# Patient Record
Sex: FEMALE
[De-identification: names, ages, dates, MRNs, and addresses within clinical notes are randomized per-mention and may not be internally consistent; named-entity substitution may affect disease eponyms.]

---

## 2017-07-08 ENCOUNTER — HEALTH MAINTENANCE LETTER (OUTPATIENT)
Age: 52
End: 2017-07-08

## 2018-08-30 ENCOUNTER — OFFICE VISIT (OUTPATIENT)
Dept: OBSTETRICS AND GYNECOLOGY | Facility: CLINIC | Age: 53
End: 2018-08-30
Payer: MEDICARE

## 2018-08-30 VITALS
SYSTOLIC BLOOD PRESSURE: 116 MMHG | BODY MASS INDEX: 38.26 KG/M2 | WEIGHT: 215.94 LBS | HEIGHT: 63 IN | DIASTOLIC BLOOD PRESSURE: 72 MMHG

## 2018-08-30 DIAGNOSIS — Z00.00 ANNUAL PHYSICAL EXAM: Primary | ICD-10-CM

## 2018-08-30 DIAGNOSIS — N95.1 MENOPAUSAL STATE: ICD-10-CM

## 2018-08-30 DIAGNOSIS — N95.2 ATROPHIC VAGINITIS: ICD-10-CM

## 2018-08-30 DIAGNOSIS — Z90.710 STATUS POST HYSTERECTOMY: ICD-10-CM

## 2018-08-30 DIAGNOSIS — N39.3 URINARY, INCONTINENCE, STRESS FEMALE: ICD-10-CM

## 2018-08-30 PROCEDURE — 88175 CYTOPATH C/V AUTO FLUID REDO: CPT

## 2018-08-30 PROCEDURE — 99999 PR PBB SHADOW E&M-NEW PATIENT-LVL III: CPT | Mod: PBBFAC,,, | Performed by: OBSTETRICS & GYNECOLOGY

## 2018-08-30 PROCEDURE — G0101 CA SCREEN;PELVIC/BREAST EXAM: HCPCS | Mod: S$GLB,,, | Performed by: OBSTETRICS & GYNECOLOGY

## 2018-08-30 PROCEDURE — 3008F BODY MASS INDEX DOCD: CPT | Mod: CPTII,S$GLB,, | Performed by: OBSTETRICS & GYNECOLOGY

## 2018-08-30 RX ORDER — CLOTRIMAZOLE AND BETAMETHASONE DIPROPIONATE 10; .64 MG/G; MG/G
CREAM TOPICAL
Qty: 15 G | Refills: 4 | Status: SHIPPED | OUTPATIENT
Start: 2018-08-30 | End: 2019-08-30

## 2018-08-30 RX ORDER — ESTRADIOL 0.1 MG/G
2 CREAM VAGINAL
Qty: 42.5 G | Refills: 6 | Status: SHIPPED | OUTPATIENT
Start: 2018-08-30 | End: 2019-10-24 | Stop reason: SDUPTHER

## 2018-08-30 NOTE — PROGRESS NOTES
Subjective:      Chief Complaint:    Chief Complaint   Patient presents with    Well Woman       Menstrual History:    OB History      Para Term  AB Living    2         2    SAB TAB Ectopic Multiple Live Births                       Menarche age: 11     No LMP recorded (lmp unknown). Patient has had a hysterectomy.                Objective:      PRESENT ILLNESS, GYN NOTE    The patient is 53 years of age, new patient to [facility], new patient to me.     2, para 2, postmenopausal.    Medical: asthma, anemia, hyperactive bladder, liver abnormalityiron deficiency,   urinary stress incontinence.    Surgery: hysterectomy, salpingo-oophorectomy, one ablation, tubal ligation, A   and P repair twice with mesh.  However, the patient is still complaining of   severe urinary stress incontinence, dryness, not sexually active.  The patient   also noted some genital discoloration of the vulva.    PHYSICAL EXAMINATION:  VITAL SIGNS:  The patient's blood pressure 116/72, weight 215 pounds.  BREASTS:  No lumps, mass, discharge, skin changes, retraction, nipple changes.    Axilla negative.  PELVIC:  External, vulva, whitish discoloration compatible with lichen   sclerosus, also some intertriginous dermatitis, probably secondary to urine in   pad that she wears.  Vagina, very thin, atrophic.  A and P repair is noted.    There is probably no erosion, but scarring noted around the graft and is   palpable.  Careful examination of vagina did not show any erosion.  [place], good   pelvic support.  Cervix, uterus and adnexa is no longer palpable.  The uterus is   absent.  RECTAL:  Negative.    IMPRESSION:  Atrophic, thin vagina, urinary stress incontinence, vulvitis and   most likely lichen sclerosus.    PLAN:  Pap smear was taken.  We will order a mammogram, bone density.  Since the   patient is not on any estrogen replacement therapy, we will start vaginal   estrogen 2 g every second day.  We will give the patient some  Lotrisone ointment   for the outside and we will see the patient back in two to three weeks and   reevaluate her and may require vulvar biopsy, also multivitamins and calcium and   vitamin D.            / [id number] blank(s)        RODRIGO/DARWIN  dd: 08/30/2018 13:45:28 (CDT)  td: 08/31/2018 04:54:29 (CDT)  Doc ID   #[id number]  Job ID #[id number]    CC:       History of Present Illness AND  Examination detailed DICTATE:        Physical Exam   Constitutional: She is oriented to person, place, and time. She appears well-developed and well-nourished. No distress.   HENT:   Head: Normoceph  Eyes: Pupils are equal, round, and reactive to light.   Neck: Neck supple.   Cardiovascular: Normal rate, regular rhythm and normal heart sounds. No murmur heard.  Pulmonary/Chest: Effort normal and breath sounds normal. No respiratory distress. She has no wheezes. She has no rales.   Abdominal: Bowel sounds are normal. She exhibits no distension and no mass. There is no tenderness. There is no rebound and no guarding.  Musculoskeletal: Normal range of motion.   Lymphadenopathy:        Right: No inguinal adenopathy present.        Left: No inguinal adenopathy present. .  Skin: Skin is warm. No rash noted.        Review of Systems  Review of Systems   Normal ROS:   Constitutional: Negative for fever, chills, activity change fatigue and unexpected weight change.   HENT: Negative for nosebleeds, congestion.  Eyes: Negative for visual disturbance.   Respiratory: Negative for shortness of breath and wheezing.    Cardiovascular: Negative for chest pain, palpitations.   Gastrointestinal: Negative for abdominal pain, diarrhea, constipation, blood in stool and abdominal distention.   Musculoskeletal: Negative for back pain.   Allergic/Immunologic: Negative f.   Neurological: Negative .   Hematological: Negative    Psychiatric/Behavioral: Negative .    Assessment:      Diagnosis: menopausal   ATROPHIC     VAG     LICHEN   SCLEROSIS   DERMATITIS       Plan:       Return in 4  weeks

## 2018-08-30 NOTE — PATIENT INSTRUCTIONS

## 2018-09-05 ENCOUNTER — TELEPHONE (OUTPATIENT)
Dept: OBSTETRICS AND GYNECOLOGY | Facility: CLINIC | Age: 53
End: 2018-09-05

## 2018-09-05 NOTE — TELEPHONE ENCOUNTER
----- Message from Sofie Shaffer sent at 9/5/2018  9:05 AM CDT -----  Contact: [facility]  Pharmacist calling to speak to a nurse regarding med below. [phone].    estradiol (ESTRACE) 0.01 % (0.1 mg/gram) vaginal cream      --------------------------------------------------------------------  9/5/18 @ 0928 ([facility])   SPOKE WITH THE PHARMACIST , CLARIFIED APPLICATION INSTRUCTIONS WITH THE PHARMACIST

## 2018-09-05 NOTE — TELEPHONE ENCOUNTER
----- Message from Gricelda Noland sent at 9/5/2018  1:57 PM CDT -----  Contact: self  Pt called to state that the pharmacy has bee calling to clarify directions of script of estradiol (ESTRACE) 0.01 % (0.1 mg/gram) vaginal cream. Should the patient be taking twice a week or daily. Please call [phone]. Pt can be reached at [phone].  ----------------------------------------------------------  9/5/18 @ 3809 ([facility])  SPOKE WITH [facility] PHARMACIST FOR THE 2ND TIME, INSTRUCTED THEM THAT THE ESTRACE CREAM IS FOR 2GMS 2 X A WEEK

## 2018-09-05 NOTE — TELEPHONE ENCOUNTER
----- Message from Chiquita Delacruz sent at 9/5/2018  3:30 PM CDT -----  Contact: Self   Patient says her medication is not covered. Please call patient at [phone]  ----------------------------------------------------------------  9/5/18 @ 3206 ([facility])   SPOKE WITH MS REYES , SHE STATED HER INSURANCE COMPANY WILL NOT PAY/ COVER THE   ESTRADIOL CREAM, IS THERE SOMETHING ELSE THAT CAN BE ORDERED

## 2018-09-05 NOTE — TELEPHONE ENCOUNTER
----- Message from Ros Webster sent at 9/5/2018 10:38 AM CDT -----  Contact: Self   Patient is asking for the office to give her a call. She would like the results from her last OV. Please call at [phone].  -------------------------------------------------------  9/5/18 @ 8313 ([facility])  SPOKE WITH MS REYES, REVIEWED WITH HER DR KEARNEY PLAN FOR HER , AND THE TEST HE ORDERED FOR HER :  Pap smear was taken.  We will order a mammogram, bone density.  Since the   patient is not on any estrogen replacement therapy, we will start vaginal   estrogen 2 g every second day.  We will give the patient some Lotrisone ointment   for the outside and we will see the patient back in two to three weeks and   reevaluate her and may require vulvar biopsy, also multivitamins and calcium and   vitamin D.    ALSO INFORMED HER THAT HER PAP SMEAR IS NORMAL AND SHE CAN REPEAT IN 1 YEAR

## 2018-09-07 ENCOUNTER — TELEPHONE (OUTPATIENT)
Dept: OBSTETRICS AND GYNECOLOGY | Facility: CLINIC | Age: 53
End: 2018-09-07

## 2018-09-07 NOTE — TELEPHONE ENCOUNTER
----- Message from Loren Ryan sent at 9/7/2018 11:47 AM CDT -----  Contact: self  Pt wants to speak nurse regarding health she's needing a prior authorization. She says she apologizes she gave the wrong Please call asap at [phone]   ----------------------------------------------  9/7/18 @ 4822 ([facility])   SPOKE WITH MS REYES , INFORMED HER THAT  WE ORDERED THE LOTRISONE CREAM BUT AS SEPARATE RX FOR CLOTRIMAZOLE AND BETAMETHASONE AND SHE CAN MIX THEM TOGETHER AND PUT A PA IN FOR HER ESTRACE CREAM, PT STATED HER UNDERSTANDING

## 2018-09-10 ENCOUNTER — TELEPHONE (OUTPATIENT)
Dept: OBSTETRICS AND GYNECOLOGY | Facility: CLINIC | Age: 53
End: 2018-09-10

## 2018-09-10 DIAGNOSIS — F52.9 FEMALE SEXUAL DYSFUNCTION: ICD-10-CM

## 2018-09-10 DIAGNOSIS — N95.2 ATROPHIC VAGINITIS: ICD-10-CM

## 2018-09-10 DIAGNOSIS — N95.1 MENOPAUSAL STATE: Primary | ICD-10-CM

## 2018-09-10 NOTE — TELEPHONE ENCOUNTER
----- Message from Buffy Johnson sent at 9/10/2018  8:40 AM CDT -----  Contact: ALFREDITO Cerna called regarding  pt's clotrimazole-betamethasone 1-0.05% (LOTRISONE) cream. She has additional questions. Contact her at [phone].    Thanks-  -------------------------------------------  9*10/18 @ 0979 ([facility])  SPOKE WITH HENNA, INFORMED HER WE ORDERED THE LOTRISONE CREAM , AS SEPARATE INSTEAD IN A COMBINATION , AND INSTRUCTED PT TO MIX THEM TOGETHER HERSELF, PHARMACIST INSTRUCTED PT ON MIXING , ALSO INFORMED HER THAT THE ESTRACE PA WAS SENT OVER BECAUSE THE PT AND PHARMACIST STATED IT WAS NEEDED, HENNA STATED THE PT RECEIVED THE GENERIC , INFORMED HER THAT IS FINE

## 2018-09-12 ENCOUNTER — HOSPITAL ENCOUNTER (OUTPATIENT)
Dept: RADIOLOGY | Facility: HOSPITAL | Age: 53
Discharge: HOME | End: 2018-09-12
Attending: OBSTETRICS & GYNECOLOGY
Payer: MEDICARE

## 2018-09-12 VITALS — WEIGHT: 215 LBS | BODY MASS INDEX: 38.09 KG/M2 | HEIGHT: 63 IN

## 2018-09-12 DIAGNOSIS — Z12.39 BREAST CANCER SCREENING: ICD-10-CM

## 2018-09-12 DIAGNOSIS — N95.1 MENOPAUSAL STATE: ICD-10-CM

## 2018-09-12 PROCEDURE — 77067 SCR MAMMO BI INCL CAD: CPT | Mod: 26,,, | Performed by: RADIOLOGY

## 2018-09-12 PROCEDURE — 77063 BREAST TOMOSYNTHESIS BI: CPT | Mod: TC

## 2018-09-12 PROCEDURE — 77063 BREAST TOMOSYNTHESIS BI: CPT | Mod: 26,,, | Performed by: RADIOLOGY

## 2018-09-12 PROCEDURE — 77080 DXA BONE DENSITY AXIAL: CPT | Mod: 26,GA,, | Performed by: RADIOLOGY

## 2018-09-12 PROCEDURE — 77080 DXA BONE DENSITY AXIAL: CPT | Mod: GA,TC

## 2018-09-20 ENCOUNTER — OFFICE VISIT (OUTPATIENT)
Dept: OBSTETRICS AND GYNECOLOGY | Facility: CLINIC | Age: 53
End: 2018-09-20
Payer: MEDICARE

## 2018-09-20 VITALS
HEIGHT: 63 IN | BODY MASS INDEX: 37.52 KG/M2 | DIASTOLIC BLOOD PRESSURE: 64 MMHG | SYSTOLIC BLOOD PRESSURE: 118 MMHG | WEIGHT: 211.75 LBS

## 2018-09-20 DIAGNOSIS — N95.2 ATROPHIC VAGINITIS: ICD-10-CM

## 2018-09-20 DIAGNOSIS — N39.3 URINARY, INCONTINENCE, STRESS FEMALE: ICD-10-CM

## 2018-09-20 DIAGNOSIS — L30.9 DERMATITIS: ICD-10-CM

## 2018-09-20 DIAGNOSIS — L90.0 LICHEN SCLEROSUS: ICD-10-CM

## 2018-09-20 DIAGNOSIS — Z90.710 STATUS POST HYSTERECTOMY: ICD-10-CM

## 2018-09-20 DIAGNOSIS — N95.1 MENOPAUSAL STATE: Primary | ICD-10-CM

## 2018-09-20 PROCEDURE — 3008F BODY MASS INDEX DOCD: CPT | Mod: CPTII,,, | Performed by: OBSTETRICS & GYNECOLOGY

## 2018-09-20 PROCEDURE — 99213 OFFICE O/P EST LOW 20 MIN: CPT | Mod: S$PBB,,, | Performed by: OBSTETRICS & GYNECOLOGY

## 2018-09-20 PROCEDURE — 99213 OFFICE O/P EST LOW 20 MIN: CPT | Mod: PBBFAC | Performed by: OBSTETRICS & GYNECOLOGY

## 2018-09-20 PROCEDURE — 99999 PR PBB SHADOW E&M-EST. PATIENT-LVL III: CPT | Mod: PBBFAC,,, | Performed by: OBSTETRICS & GYNECOLOGY

## 2018-09-20 NOTE — PROGRESS NOTES
Subjective:      Chief Complaint:    Chief Complaint   Patient presents with    Follow-up       Menstrual History:    OB History      Para Term  AB Living    2 2 2     2    SAB TAB Ectopic Multiple Live Births                       Menarche age: 11     No LMP recorded (lmp unknown). Patient has had a hysterectomy.            Objective:      PROBLEM FOCUSED EXAMINATION NOTE:  The patient is 53 years of age.  Returned   from previous exam.  The patient received the benefit of mammography, bone   density, Pap smear.  Mammogram and Pap smear are normal; however, bone density   indicated osteoporosis of the hip.  The patient also was found to have at that   time dermatitis in the iliofemoral crease, treated with Lotrisone.  The patient   also had a whitish discoloration of the vulva, labia minora on both sides, which   was felt to be lichen.    The patient's past medical history and problem list has been reviewed.    Pertinent history, Hysterectomy, salpingo-oophorectomy, thermal ablation and   tubal ligation.  The patient also was not on any estrogen replacement therapy.    Again, was found to have atrophic vaginitis with some vaginal scarring, was   started at that time on estrogen replacement therapy.  The patient returns for   followup.    PHYSICAL EXAMINATION:  VITAL SIGNS:  Blood pressure 118/64, weight 211.  PELVIC:  The genital area, the vulvitis is markedly improved; however, the   lichen, white discoloration is still present.    We will schedule the patient for vulvar colposcopy and vulvar biopsy and also   increase the estrogen every other day vaginally, also add weightbearing exercise   about 3 pounds, walk around 15-20 minutes three times a week.  We will repeat   the bone density in a year.  The patient will return next week for vulvar   biopsy.      ZAYNAB  dd: 2018 13:30:08 (CDT)  td: 2018 02:00:43 (CDT)  Doc ID   #[id number]  Job ID #[id number]    CC:       History of Present Illness  AND  Examination detailed DICTATE:             Assessment:      Diagnosis: menopausal    dermatiTis    lichen       Plan:      Return in 2  weeks

## 2018-10-02 ENCOUNTER — PROCEDURE VISIT (OUTPATIENT)
Dept: OBSTETRICS AND GYNECOLOGY | Facility: CLINIC | Age: 53
End: 2018-10-02
Payer: MEDICARE

## 2018-10-02 VITALS
WEIGHT: 211.56 LBS | DIASTOLIC BLOOD PRESSURE: 74 MMHG | BODY MASS INDEX: 38.93 KG/M2 | HEIGHT: 62 IN | SYSTOLIC BLOOD PRESSURE: 112 MMHG

## 2018-10-02 DIAGNOSIS — N90.89 VULVAR LESION: ICD-10-CM

## 2018-10-02 DIAGNOSIS — Z90.710 STATUS POST HYSTERECTOMY: ICD-10-CM

## 2018-10-02 DIAGNOSIS — N95.1 MENOPAUSAL STATE: Primary | ICD-10-CM

## 2018-10-02 PROCEDURE — 88342 IMHCHEM/IMCYTCHM 1ST ANTB: CPT | Performed by: PATHOLOGY

## 2018-10-02 PROCEDURE — 88342 IMHCHEM/IMCYTCHM 1ST ANTB: CPT | Mod: 26,,, | Performed by: PATHOLOGY

## 2018-10-02 PROCEDURE — 56605 BIOPSY OF VULVA/PERINEUM: CPT | Mod: ,,, | Performed by: OBSTETRICS & GYNECOLOGY

## 2018-10-02 PROCEDURE — 57500 BIOPSY OF CERVIX: CPT | Mod: PBBFAC | Performed by: OBSTETRICS & GYNECOLOGY

## 2018-10-02 PROCEDURE — 88305 TISSUE EXAM BY PATHOLOGIST: CPT | Mod: 26,,, | Performed by: PATHOLOGY

## 2018-10-02 PROCEDURE — 88305 TISSUE EXAM BY PATHOLOGIST: CPT | Performed by: PATHOLOGY

## 2018-10-02 NOTE — PROCEDURES
HISTORY OF PRESENT ILLNESS:  The patient is 53 years old, was recently here for   annual exam, was found to have whitish lesions on the vulva, on the labia minora   and majora, both sides, the worst appearing area was on the left side.  The   patient returned to the clinic for a vulvar biopsy.    PROCEDURE:  The patient was put in the dorsal lithotomy position.  Betadine   applied to the abnormal area on the labia.  Then, using Xylocaine, about 2 mL   was injected on the lesion.  Then taking the biopsy forceps, a section of the   worst appearing area was biopsied and submitted for pathological diagnosis.  The   base of the lesion excision site then was cauterized with silver nitrate,   resulting good hemostasis.  The patient tolerated the procedure well with   minimal pain, discomfort, was instructed to wash the area when she gets home and   refrain from sexual activity for about five to six days.  We will let her know   biopsy report as soon as available.  Otherwise, we will see her back in three   weeks for followup    DIAGNOSIS:  Most likely lichen; however, neoplasia must be ruled out.      ZAYNAB  dd: 10/02/2018 11:11:11 (CDT)  td: 10/03/2018 08:40:10 (CDT)  Doc ID   #[id number]  Job ID #[id number]    CC:

## 2018-10-08 ENCOUNTER — PATIENT MESSAGE (OUTPATIENT)
Dept: OBSTETRICS AND GYNECOLOGY | Facility: CLINIC | Age: 53
End: 2018-10-08

## 2018-10-08 ENCOUNTER — TELEPHONE (OUTPATIENT)
Dept: OBSTETRICS AND GYNECOLOGY | Facility: CLINIC | Age: 53
End: 2018-10-08

## 2018-10-08 RX ORDER — BETAMETHASONE DIPROPIONATE 0.5 MG/G
CREAM TOPICAL
Qty: 15 G | Refills: 23 | Status: SHIPPED | OUTPATIENT
Start: 2018-10-08 | End: 2019-10-24 | Stop reason: SDUPTHER

## 2018-10-08 RX ORDER — CLOTRIMAZOLE 1 %
CREAM (GRAM) TOPICAL
Qty: 45 BOTTLE | Refills: 2 | Status: SHIPPED | OUTPATIENT
Start: 2018-10-08

## 2018-10-08 NOTE — TELEPHONE ENCOUNTER
Me   to Melisa Colón           10/8/18 5:19 PM   KATHERINE MS COLÓN,   I will send DR STOVALL a message that you would like refills of your medication   Thank you      This Patient Portal message has not been read.   Melisa Colón   to Silver Stovall MD           10/8/18 3:29 PM   Hi I was informed that my test results are back and am curious about the results.  Also, I am almost out of the creams.  I would just ask for a refill but in order to get it paid for by my insurance I have to have the creams ordered separately... betamethasone  and the other element  I cant remem I believe it was clortrimazole.  I also wanted to ask if we could try a different approach for  the osteoporosis such as prosilia? I think thats how its spelled.  Thank you ... Melisa

## 2018-10-09 ENCOUNTER — PATIENT MESSAGE (OUTPATIENT)
Dept: OBSTETRICS AND GYNECOLOGY | Facility: CLINIC | Age: 53
End: 2018-10-09

## 2018-10-09 ENCOUNTER — OFFICE VISIT (OUTPATIENT)
Dept: OBSTETRICS AND GYNECOLOGY | Facility: CLINIC | Age: 53
End: 2018-10-09
Payer: MEDICARE

## 2018-10-09 ENCOUNTER — TELEPHONE (OUTPATIENT)
Dept: OBSTETRICS AND GYNECOLOGY | Facility: CLINIC | Age: 53
End: 2018-10-09

## 2018-10-09 VITALS — HEIGHT: 62 IN | BODY MASS INDEX: 38.83 KG/M2 | WEIGHT: 211 LBS

## 2018-10-09 DIAGNOSIS — L90.0 LICHEN SCLEROSUS: ICD-10-CM

## 2018-10-09 DIAGNOSIS — M85.80 OSTEOPENIA, UNSPECIFIED LOCATION: ICD-10-CM

## 2018-10-09 DIAGNOSIS — Z90.710 STATUS POST HYSTERECTOMY: ICD-10-CM

## 2018-10-09 DIAGNOSIS — N95.1 MENOPAUSAL STATE: Primary | ICD-10-CM

## 2018-10-09 PROCEDURE — 99212 OFFICE O/P EST SF 10 MIN: CPT | Mod: S$PBB,,, | Performed by: OBSTETRICS & GYNECOLOGY

## 2018-10-09 PROCEDURE — 99212 OFFICE O/P EST SF 10 MIN: CPT | Mod: PBBFAC | Performed by: OBSTETRICS & GYNECOLOGY

## 2018-10-09 PROCEDURE — 3008F BODY MASS INDEX DOCD: CPT | Mod: CPTII,,, | Performed by: OBSTETRICS & GYNECOLOGY

## 2018-10-09 PROCEDURE — 99999 PR PBB SHADOW E&M-EST. PATIENT-LVL II: CPT | Mod: PBBFAC,,, | Performed by: OBSTETRICS & GYNECOLOGY

## 2018-10-09 RX ORDER — CLOBETASOL PROPIONATE 0.05 MG/G
GEL TOPICAL 2 TIMES DAILY
Qty: 30 G | Refills: 2 | Status: SHIPPED | OUTPATIENT
Start: 2018-10-09 | End: 2018-11-07 | Stop reason: ALTCHOICE

## 2018-10-09 NOTE — TELEPHONE ENCOUNTER
10/9/18 @ 0912 ([facility])  SPOKE WITH MS REYES , INFORMED HER THAT DR KEARNEY WANTS TO SEE HER IN HIS OFFICE APPT MADE FOR TODAY @ 6892

## 2018-10-09 NOTE — PROGRESS NOTES
Subjective:      Chief Complaint: LICHEN   SC    Menstrual History:    OB History      Para Term  AB Living    2 2 2     2    SAB TAB Ectopic Multiple Live Births                       Menarche age: 11     No LMP recorded (lmp unknown). Patient has had a hysterectomy.       The patient is 53 years of age, returned for consultation from previous vulvar   biopsy.  The patient is a  2, para 2, post-hysterectomy.  The patient   also was found to have osteoporosis, presently on estrogen vaginal, calcium and   vitamin D and increased physical activity.  The patient's vulvar biopsy came   back lichen sclerosus.  Discussed this with the patient, the nature of the   condition and treatment.  We will put the patient on Temovate applied twice a   day for the next two weeks, then twice a week and we will see the patient back   within six weeks and then reevaluate the situation and decide on followup and   treatment.      ZAYNAB  dd: 10/09/2018 11:18:11 (CDT)  td: 10/10/2018 06:36:01 (CDT)  Doc ID   #[id number]  Job ID #[id number]    CC:          Objective:        History of Present Illness AND  Examination detailed DICTATE:             Assessment:      Diagnosis:MENOPAUSAL   LICHEN   SCLEROSIS       Plan:      Return in 6  weeks

## 2018-10-11 ENCOUNTER — PATIENT MESSAGE (OUTPATIENT)
Dept: OBSTETRICS AND GYNECOLOGY | Facility: CLINIC | Age: 53
End: 2018-10-11

## 2018-10-16 ENCOUNTER — TELEPHONE (OUTPATIENT)
Dept: OBSTETRICS AND GYNECOLOGY | Facility: CLINIC | Age: 53
End: 2018-10-16

## 2018-10-16 NOTE — TELEPHONE ENCOUNTER
----- Message from Chiquita Delacruz sent at 10/16/2018 11:35 AM CDT -----  Contact: Self   Patient says she need update directions for her medication. She also need a replacement for the most recent medication that was called in for her. Please call at [phone].    Need new directions  estradiol (ESTRACE) 0.01 % (0.1 mg/gram) vaginal cream    Need Replace Insurance does not cover  clobetasol (TEMOVATE) 0.05 % Gel      [id number] - [place], [place] - [address]

## 2018-10-17 DIAGNOSIS — N76.2 ACUTE VULVITIS: Primary | ICD-10-CM

## 2018-10-17 RX ORDER — NYSTATIN AND TRIAMCINOLONE ACETONIDE 100000; 1 [USP'U]/G; MG/G
CREAM TOPICAL
Qty: 30 G | Refills: 1 | Status: SHIPPED | OUTPATIENT
Start: 2018-10-17 | End: 2019-10-17

## 2018-10-23 DIAGNOSIS — N76.2 ACUTE VULVITIS: Primary | ICD-10-CM

## 2018-11-07 ENCOUNTER — HOSPITAL ENCOUNTER (EMERGENCY)
Facility: HOSPITAL | Age: 53
Discharge: HOME | End: 2018-11-07
Attending: NURSE PRACTITIONER
Payer: MEDICARE

## 2018-11-07 VITALS
HEIGHT: 62 IN | DIASTOLIC BLOOD PRESSURE: 83 MMHG | SYSTOLIC BLOOD PRESSURE: 143 MMHG | TEMPERATURE: 98 F | RESPIRATION RATE: 18 BRPM | HEART RATE: 76 BPM | OXYGEN SATURATION: 95 % | WEIGHT: 208 LBS | BODY MASS INDEX: 38.28 KG/M2
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
## 2018-11-07 DIAGNOSIS — S81.012A LACERATION OF LEFT KNEE, INITIAL ENCOUNTER: Primary | ICD-10-CM

## 2018-11-07 DIAGNOSIS — T14.90XA TRAUMA: ICD-10-CM

## 2018-11-07 PROCEDURE — 99283 EMERGENCY DEPT VISIT LOW MDM: CPT | Mod: 25

## 2018-11-07 PROCEDURE — 25000003 PHARM REV CODE 250: Performed by: EMERGENCY MEDICINE

## 2018-11-07 PROCEDURE — 12001 RPR S/N/AX/GEN/TRNK 2.5CM/<: CPT

## 2018-11-07 RX ORDER — OXYCODONE AND ACETAMINOPHEN 5; 325 MG/1; MG/1
1 TABLET ORAL
Status: COMPLETED | OUTPATIENT
Start: 2018-11-07 | End: 2018-11-07

## 2018-11-07 RX ORDER — ERGOCALCIFEROL 1.25 MG/1
50000 CAPSULE ORAL
COMMUNITY

## 2018-11-07 RX ORDER — ACETAMINOPHEN 325 MG/1
325 TABLET ORAL EVERY 6 HOURS PRN
COMMUNITY

## 2018-11-07 RX ORDER — DULOXETIN HYDROCHLORIDE 30 MG/1
90 CAPSULE, DELAYED RELEASE ORAL DAILY
COMMUNITY

## 2018-11-07 RX ORDER — LIDOCAINE HYDROCHLORIDE 10 MG/ML
10 INJECTION, SOLUTION EPIDURAL; INFILTRATION; INTRACAUDAL; PERINEURAL
Status: DISCONTINUED | OUTPATIENT
Start: 2018-11-07 | End: 2018-11-07 | Stop reason: HOSPADM

## 2018-11-07 RX ADMIN — OXYCODONE HYDROCHLORIDE AND ACETAMINOPHEN 1 TABLET: 5; 325 TABLET ORAL at 04:11

## 2018-11-07 NOTE — ED PROVIDER NOTES
Encounter Date: 11/7/2018       History     Chief Complaint   Patient presents with    Knee Injury     Left knee laceration and pain after trip and fall today.     53 y.o. .female Past Medical History:  No date: Anemia  No date: Asthma  No date: Bladder spasms      Comment:  since she was a teenager  2014: Fibromyalgia  08/01/2018: Lichen spinulosus  No date: Liver disease      Comment:  Ornithine Transcarbamylase Deficiency  08/01/2018: Osteoporosis    Presents for evaluation of L knee pain, denies hitting head/loc, no neck/back pain. Tetanus up to date. Notes lac to L knee states she washed it out extensively at home. She used to be an RN. She also put abx ointment on it.          Review of patient's allergies indicates:   Allergen Reactions    Augmentin [amoxicillin-pot clavulanate] Rash    Doxycycline hcl      Severe loose bowels    Sulfa (sulfonamide antibiotics) Anaphylaxis     Past Medical History:   Diagnosis Date    Anemia     Asthma     Bladder spasms     since she was a teenager    Fibromyalgia 2014    Lichen spinulosus 08/01/2018    Liver disease     Ornithine Transcarbamylase Deficiency    Osteoporosis 08/01/2018     Past Surgical History:   Procedure Laterality Date    ABLATION COLPOCLESIS  2003    1 ovary removed    HYSTERECTOMY  2004    OOPHORECTOMY  2003    TUBAL LIGATION  1984     Family History   Problem Relation Age of Onset    No Known Problems Father     Uterine cancer Mother      Social History     Tobacco Use    Smoking status: Never Smoker    Smokeless tobacco: Never Used    Tobacco comment: smoked forty yrs ago   Substance Use Topics    Alcohol use: No     Frequency: Never    Drug use: No     Review of Systems   Constitutional: Negative for fever.   HENT: Negative for sore throat.    Respiratory: Negative for shortness of breath.    Cardiovascular: Negative for chest pain.   Gastrointestinal: Negative for nausea.   Genitourinary: Negative for dysuria.    Musculoskeletal: Negative for back pain.   Skin: Negative for rash.   Neurological: Negative for weakness.   Hematological: Does not bruise/bleed easily.   All other systems reviewed and are negative.  +L knee pain    Physical Exam     Initial Vitals [11/07/18 1533]   BP Pulse Resp Temp SpO2   (!) 157/84 95 18 97.9 °F (36.6 °C) 98 %      MAP       --         Physical Exam    Nursing note and vitals reviewed.  Constitutional: She appears well-developed and well-nourished.   HENT:   Head: Normocephalic and atraumatic.   Eyes: Conjunctivae and EOM are normal. Pupils are equal, round, and reactive to light.   Neck: Normal range of motion.   Cardiovascular: Normal rate.   Pulmonary/Chest: No respiratory distress.   Abdominal: She exhibits no distension.   Musculoskeletal: Normal range of motion.   Neurological: She is alert. No cranial nerve deficit. GCS score is 15. GCS eye subscore is 4. GCS verbal subscore is 5. GCS motor subscore is 6.   Skin: Skin is warm and dry.   Psychiatric: She has a normal mood and affect. Thought content normal.     L knee with superficial 2cm scrape with a 4mm lac, no ant/post drawer, no med/lat laxity or effusion, able to flex/ext leg. No patellar ttp, +ttp distal tib/fib  Intact flex/extend, intact patellar reflexes  Pt able to ambulate at baseline with her cane    ED Course   Lac Repair  Date/Time: 11/7/2018 4:03 PM  Performed by: Stefania Neal MD  Authorized by: Stefania Neal MD   Body area: lower extremity  Laceration length: 0.4 cm  Foreign bodies: no foreign bodies  Tendon involvement: none  Nerve involvement: none  Amount of cleaning: standard  Debridement: none  Degree of undermining: none  Skin closure: staples  Number of sutures: 2  Technique: simple  Approximation: loose  Approximation difficulty: simple  Patient tolerance: Patient tolerated the procedure well with no immediate complications        Labs Reviewed - No data to display       Imaging Results     None          Medical Decision Making:   Initial Assessment:   53 y.o. female here for evaluation L knee pain s/p fall  ED Management:  Have ordered xrays L knee/tib/fib/ankle. Will wash wound, close lac, xray                     X-Ray Knee 3 View Left   Final Result      Prepatellar soft tissue swelling/contusion and probable superimposed laceration, without acute displaced fracture-dislocation or radiodense retained foreign body identified.         Electronically signed by: Ney Garnica MD   Date:    11/07/2018   Time:    17:08      X-Ray Ankle Complete Left   Final Result      Medial left ankle mild nonspecific soft tissue swelling without acute displaced fracture-dislocation identified, which may represent sprain.         Electronically signed by: Ney Garnica MD   Date:    11/07/2018   Time:    15:57             Clinical Impression:   The primary encounter diagnosis was Laceration of left knee, initial encounter. Diagnoses of Trauma and Fall were also pertinent to this visit.                             Stefania Neal MD  11/07/18 6718

## 2018-11-07 NOTE — DISCHARGE INSTRUCTIONS
Thank you for coming to our Emergency Department today. It is important to remember that some problems are difficult to diagnose and may not be found during your first visit. Be sure to follow up with your primary care doctor.    Return to the ER with any questions/concerns, new/concerning symptoms, worsening or failure to improve. Do not drive or make any important decisions for 24 hours if you have received any pain medications, sedatives or mood altering drugs during your ER visit.    Staple removal in 10-14 days

## 2018-11-07 NOTE — ED NOTES
The pt called for her ride and we must see them before she leaves. Is aware that she has to stay for 3 to 4 hours until the medications wears off, if her ride does not come. She verbalized an understanding.

## 2018-11-13 ENCOUNTER — OFFICE VISIT (OUTPATIENT)
Dept: OBSTETRICS AND GYNECOLOGY | Facility: CLINIC | Age: 53
End: 2018-11-13
Payer: MEDICARE

## 2018-11-13 VITALS
WEIGHT: 211 LBS | SYSTOLIC BLOOD PRESSURE: 124 MMHG | HEIGHT: 62 IN | BODY MASS INDEX: 38.83 KG/M2 | DIASTOLIC BLOOD PRESSURE: 84 MMHG

## 2018-11-13 DIAGNOSIS — L90.0 LICHEN SCLEROSUS ET ATROPHICUS: ICD-10-CM

## 2018-11-13 DIAGNOSIS — Z90.710 STATUS POST HYSTERECTOMY: ICD-10-CM

## 2018-11-13 DIAGNOSIS — N95.1 MENOPAUSAL STATE: Primary | ICD-10-CM

## 2018-11-13 PROCEDURE — 3008F BODY MASS INDEX DOCD: CPT | Mod: CPTII,S$GLB,, | Performed by: OBSTETRICS & GYNECOLOGY

## 2018-11-13 PROCEDURE — 99999 PR PBB SHADOW E&M-EST. PATIENT-LVL III: CPT | Mod: PBBFAC,,, | Performed by: OBSTETRICS & GYNECOLOGY

## 2018-11-13 PROCEDURE — 99213 OFFICE O/P EST LOW 20 MIN: CPT | Mod: S$GLB,,, | Performed by: OBSTETRICS & GYNECOLOGY

## 2018-11-13 NOTE — PROGRESS NOTES
Subjective:      Chief Complaint:  LICHEN  Chief Complaint   Patient presents with    Follow-up       Menstrual History:    OB History      Para Term  AB Living    2 2 2     2    SAB TAB Ectopic Multiple Live Births                       Menarche age: 11     No LMP recorded (lmp unknown). Patient has had a hysterectomy.       PROBLEM FOCUSSED FOLLOWUP EXAMINATION    The patient is 53 years of age.   2, para 2, post-hysterectomy.  The   patient has been followed in the clinic because of lichen sclerosus atrophicus.    It is biopsy proven.  The patient is taking Temovate now topical, apply to the   area twice a day.  The lichen is substantially improved; the symptoms markedly   improved.  However, the biopsy site is not completely closed and healed, most   likely because of irritation and the patient cleans herself.  Recommended to her   rather than rubbing you just have to blot over the area.  We will continue with   the Temovate and we will see the patient back in three months.            /kait [id number] blank(s)              RODRIGO/DARWIN  dd: 2018 09:29:19 (CST)  td: 2018 05:36:24 (CST)  Doc ID   #[id number]  Job ID #[id number]    CC:          Objective:        History of Present Illness AND  Examination detailed DICTATE:             Assessment:      Diagnosis:MENOPAUSAL    LICHEN SCLEROSUS         Plan:      Return in 3  months

## 2018-11-23 ENCOUNTER — HOSPITAL ENCOUNTER (EMERGENCY)
Facility: HOSPITAL | Age: 53
Discharge: HOME | End: 2018-11-23
Attending: EMERGENCY MEDICINE
Payer: MEDICARE

## 2018-11-23 VITALS
WEIGHT: 210 LBS | DIASTOLIC BLOOD PRESSURE: 85 MMHG | SYSTOLIC BLOOD PRESSURE: 167 MMHG | TEMPERATURE: 98 F | OXYGEN SATURATION: 96 % | HEIGHT: 62 IN | HEART RATE: 100 BPM | RESPIRATION RATE: 20 BRPM | BODY MASS INDEX: 38.64 KG/M2

## 2018-11-23 DIAGNOSIS — Z48.02 ENCOUNTER FOR STAPLE REMOVAL: Primary | ICD-10-CM

## 2018-11-23 PROCEDURE — 99281 EMR DPT VST MAYX REQ PHY/QHP: CPT

## 2018-11-23 NOTE — ED PROVIDER NOTES
Encounter Date: 11/23/2018       History     Chief Complaint   Patient presents with    Suture / Staple Removal     to left knee placed 1 wk ago     This is a 53-year-old female with a history of anemia, asthma, fibromyalgia, osteoporosis that comes to the emergency room for staple removal.  Patient reports that she had a left knee injury approximately 1 and half weeks ago and had staples placed that day at this facility.  Patient denies any complications since that event; she denies any redness, swelling, bleeding, pain, purulent discharge. No other modifying factors.          Review of patient's allergies indicates:   Allergen Reactions    Augmentin [amoxicillin-pot clavulanate] Rash    Doxycycline hcl      Severe loose bowels    Mushroom Shortness Of Breath    Sulfa (sulfonamide antibiotics) Anaphylaxis    Orange Hives    [place] Rash     Past Medical History:   Diagnosis Date    Anemia     Asthma     Bladder spasms     since she was a teenager    Fibromyalgia 2014    Lichen spinulosus 08/01/2018    Liver disease     Ornithine Transcarbamylase Deficiency    Osteoporosis 08/01/2018     Past Surgical History:   Procedure Laterality Date    ABLATION COLPOCLESIS  2003    1 ovary removed    HYSTERECTOMY  2004    OOPHORECTOMY  2003    TUBAL LIGATION  1984     Family History   Problem Relation Age of Onset    No Known Problems Father     Uterine cancer Mother      Social History     Tobacco Use    Smoking status: Never Smoker    Smokeless tobacco: Never Used    Tobacco comment: smoked forty yrs ago   Substance Use Topics    Alcohol use: No     Frequency: Never    Drug use: No     Review of Systems   Constitutional: Negative for chills and fever.   Musculoskeletal: Negative for arthralgias (no knee pain).   Skin: Positive for wound (healed laceration to left knee).   Neurological: Negative for weakness and numbness.       Physical Exam     Initial Vitals [11/23/18 1040]   BP Pulse Resp Temp  SpO2   (!) 167/85 100 20 97.6 °F (36.4 °C) 96 %      MAP       --         Physical Exam    Nursing note and vitals reviewed.  Constitutional: Vital signs are normal. She appears well-developed and well-nourished. She is not diaphoretic. She is active and cooperative. She does not appear ill. No distress.   Eyes: Pupils are equal, round, and reactive to light.   Musculoskeletal:        Legs:  Skin: Skin is dry. Capillary refill takes less than 2 seconds.   Psychiatric: She has a normal mood and affect.         ED Course   Suture Removal  Date/Time: 11/23/2018 11:44 AM  Performed by: Roopa Liang NP  Authorized by: Bradley Paul MD   Body area: lower extremity  Location details: left knee  Wound Appearance: clean, well healed and nontender  Staples Removed: 2  Post-removal: dressing applied  Facility: sutures placed in this facility  Complications: No  Specimens: No  Implants: No        Labs Reviewed - No data to display       Imaging Results    None             Additional MDM:   Comments: This is an urgent evaluation of a 53-year-old female that presents to the emergency room for staple removal.  Patient had staples placed approximately 10 days ago after blunt force trauma to the left knee.  She denies any complaints since being discharged.  The knee exam is normal. She is ambulating without difficulty and ranging the joint well.  There is no erythema, swelling, warmth, tenderness to the joint.  No evidence of infection, septic knee, compartment syndrome. The wound edges are well approximated.  The staples were removed without complication.  To follow up with PCP for any further concerns, or to return to the emergency room for any new or worsening symptoms.  Discharged in stable condition..                    Clinical Impression:   The encounter diagnosis was Encounter for staple removal.      Disposition:   Disposition: Discharged  Condition: Stable                        Roopa Liang NP  11/23/18 1148

## 2018-11-23 NOTE — ED TRIAGE NOTES
Patient presents tot he ED via personal transportation with son. Patient reports that she came in today to have staples removed from left knee that were placed 10 days ago. Denies redness, swelling. Or drainage to the site. Denies fever, chills.

## 2018-12-03 PROBLEM — Z00.00 ANNUAL PHYSICAL EXAM: Status: RESOLVED | Noted: 2018-08-30 | Resolved: 2018-12-03

## 2019-10-04 ENCOUNTER — HEALTH MAINTENANCE LETTER (OUTPATIENT)
Age: 54
End: 2019-10-04

## 2019-10-24 RX ORDER — BETAMETHASONE DIPROPIONATE 0.5 MG/G
CREAM TOPICAL
Qty: 45 G | Refills: 4 | Status: SHIPPED | OUTPATIENT
Start: 2019-10-24

## 2019-10-24 RX ORDER — TRIAMCINOLONE ACETONIDE 1 MG/G
CREAM TOPICAL
Qty: 45 BOTTLE | Refills: 1 | Status: SHIPPED | OUTPATIENT
Start: 2019-10-24

## 2019-10-24 RX ORDER — ESTRADIOL 0.1 MG/G
CREAM VAGINAL
Qty: 42.5 G | Refills: 3 | Status: SHIPPED | OUTPATIENT
Start: 2019-10-24

## 2020-06-26 ENCOUNTER — OFFICE VISIT (OUTPATIENT)
Dept: OBSTETRICS AND GYNECOLOGY | Facility: CLINIC | Age: 55
End: 2020-06-26
Payer: MEDICARE

## 2020-06-26 VITALS
BODY MASS INDEX: 41.3 KG/M2 | SYSTOLIC BLOOD PRESSURE: 116 MMHG | WEIGHT: 224.44 LBS | HEIGHT: 62 IN | DIASTOLIC BLOOD PRESSURE: 70 MMHG | TEMPERATURE: 97 F

## 2020-06-26 DIAGNOSIS — Z12.31 SCREENING MAMMOGRAM FOR HIGH-RISK PATIENT: ICD-10-CM

## 2020-06-26 DIAGNOSIS — K90.9 OSTEOPOROSIS DUE TO MALABSORPTION: ICD-10-CM

## 2020-06-26 DIAGNOSIS — Z91.89 GYN EXAM FOR HIGH-RISK MEDICARE PATIENT: Primary | ICD-10-CM

## 2020-06-26 DIAGNOSIS — N90.4 LICHEN SCLEROSUS ET ATROPHICUS OF THE VULVA: ICD-10-CM

## 2020-06-26 DIAGNOSIS — M81.8 OSTEOPOROSIS DUE TO MALABSORPTION: ICD-10-CM

## 2020-06-26 PROCEDURE — G0101 PR CA SCREEN;PELVIC/BREAST EXAM: ICD-10-PCS | Mod: S$GLB,,, | Performed by: OBSTETRICS & GYNECOLOGY

## 2020-06-26 PROCEDURE — 99999 PR PBB SHADOW E&M-EST. PATIENT-LVL III: ICD-10-PCS | Mod: PBBFAC,,, | Performed by: OBSTETRICS & GYNECOLOGY

## 2020-06-26 PROCEDURE — G0101 CA SCREEN;PELVIC/BREAST EXAM: HCPCS | Mod: S$GLB,,, | Performed by: OBSTETRICS & GYNECOLOGY

## 2020-06-26 PROCEDURE — 99999 PR PBB SHADOW E&M-EST. PATIENT-LVL III: CPT | Mod: PBBFAC,,, | Performed by: OBSTETRICS & GYNECOLOGY

## 2020-06-26 RX ORDER — CLOBETASOL PROPIONATE 0.5 MG/G
CREAM TOPICAL NIGHTLY
Qty: 30 G | Refills: 2 | Status: SHIPPED | OUTPATIENT
Start: 2020-06-26 | End: 2021-10-04 | Stop reason: SDUPTHER

## 2020-06-26 NOTE — PROGRESS NOTES
[facility]  Ambulatory Clinic  Obstetrics & Gynecology    Visit Date:  2020    Chief Complaint:  Annual GYN exam    History of Present Illness:      Melisa Colón is a 55 y.o. , new pt to me, here for a gynecologic exam and follow up lichen sclerosus of vulva.    Pt has h/o lichen sclerosus and treated with topical steroid with good control.  Pt reports running out of steroids and sxs has since returned.  Pt had bx of vulva with Dr. Stovall 10/2018 which was benign.    Pt reports a h/o laparoscopic hysterectomy and [place].    Pt reports an uneventful transition into menopause and is not on hormone replacement therapy.      Pt denies h/o abnormal pap, last pap ~.    Pt denies active sexually transmitted infections.    Pt denies h/o abnormal mammogram.    Pt has osteoporosis and is on calcium supplementation, denies bone pains or fx.    Pt performs monthly self breast examination, non-smoker, uses seat belts, and denies abuse.     Pt denies vaginal bleeding, vaginal discharge, dyspareunia, pelvic pain, bloating, early satiety, unintentional weight loss, breast mass/skin changes, incontinence, GI or urinary complaints.      Otherwise, the pt is in her usual state of health.    Past History:  Gynecologic history as noted above.    Review of Systems:      GENERAL:  No fever, fatigue, excessive weight gain or loss  HEENT:  No headaches, hearing changes, visual disturbance  RESPIRATORY:  No cough, shortness of breath  CARDIOVASCULAR:  No chest pain, heart palpitations, leg swelling  BREAST:  No lump, pain, nipple discharge, skin changes  GASTROINTESTINAL:  No nausea, vomiting, constipation, diarrhea, abd pain, rectal bleeding   GENITOURINARY:  See HPI  ENDOCRINE:  No heat or cold intolerance  HEMATOLOGIC:  No easy bruisability or bleeding   LYMPHATICS:  No enlarged nodes  MUSCULOSKELETAL:  No acute joint pain or swelling  SKIN:   "No rash, lesions, jaundice  NEUROLOGIC:  No dizziness, weakness, syncope  PSYCHIATRIC:  No significant mood changes, homicidal/suicidal ideations    Physical Exam:     /70 (BP Location: Right arm, Patient Position: Sitting, BP Method: Large (Manual))   Temp 97.3 °F (36.3 °C) (Oral)   Ht 5' 2" (1.575 m)   Wt 101.8 kg (224 lb 6.9 oz)   LMP  (LMP Unknown)   BMI 41.05 kg/m²   Pulse 70, Resp rate 16    GENERAL:  No acute distress, well-nourished  HEENT:  Atraumatic, anicteric, moist mucus membranes, neck supple w/o masses.  BREAST:  Symmetric, nontender, no obvious masses, adenopathy, skin changes or nipple discharge.  LUNGS:  Clear to auscultation  HEART:  Regular rate and rhythm, no murmurs, gallops, or rubs  ABDOMEN:  Soft, non-tender, non-distended, normoactive bowel sounds, no obvious organomegaly  EXT:  Symmetric w/o cramping, claudication, or edema. +2 distal pulses.  SKIN:  No rashes or bruising  PSYCH:  Mood and affect appropriate  NEURO:  Grossly intact bilaterally     GENITOURINARY:    VULVAR:  Mild benign appearing lichen sclerosus spanning bilateral labia majora.  Otherwise, female external genitalia w/o any obvious lesions. Normal urethral meatus. No gross lymphadenopathy.   VAGINA:  Age appropriate vulvovaginal atrophy. Adequate support. No significant cystocele or rectocele. No obvious lesion. No discharge.  CERVIX:   Surgically absent. No cuff lesions or tenderness.     UTERUS:  Surgically absent.   ADNEXA:  Non-palpable, no obvious masses, non-tender   RECTAL:  Declined. No obvious external lesions    Chaperone present for exam.    Assessment:     55 y.o.  h/o laparoscopic hysterectomy and left oophorectomy:    1. GYN exam for high-risk Medicare patient  2. Lichen sclerosus of vulva  3. H/o osteoporosis    Plan:    A gynecologic health assessment was performed with age appropriate counseling.    Cervical cancer screening - pap up to date.    Screening mammogram ordered, pt advised to " call to schedule.    Discussed the itch/scratch cycle associated with lichen sclerosus.   Start clobetasol ointment -- apply to affected area 2x/day for 2 weeks, 1x/day for 2 weeks, then 2x/week. Increase to 2x/day with flares.   Discuss oral anti-itch medication, discussed benadryl.   Discussed vulvar bx to rule out vulvar malignancy, pt declined, stressed importance if no improvement with steroids.    Discuss osteoporosis.  Refer to endocrine.  Pt advised to call and scheduled.  Continue Ca supplementation in meantime.  Fracture precautions.  Bone/muscle strengthening exercises discussed.    Encourage healthy lifestyle modifications, monthly self breast exams, Ca/Vit D, postmenopausal bleeding precautions, yearly mammogram, screening colonoscopy, COVID precautions.    F/u with PCP for health maintenance.    Return 3 months for follow-up of lichen sclerosus, or sooner as needed, pt advised to call and schedule.  All questions answered, pt voiced understanding.        Rahul Phillips MD

## 2020-07-02 ENCOUNTER — HOSPITAL ENCOUNTER (OUTPATIENT)
Dept: RADIOLOGY | Facility: HOSPITAL | Age: 55
Discharge: HOME | End: 2020-07-02
Attending: OBSTETRICS & GYNECOLOGY
Payer: MEDICARE

## 2020-07-02 VITALS — WEIGHT: 224.44 LBS | BODY MASS INDEX: 41.3 KG/M2 | HEIGHT: 62 IN

## 2020-07-02 DIAGNOSIS — Z12.31 SCREENING MAMMOGRAM FOR HIGH-RISK PATIENT: ICD-10-CM

## 2020-07-02 PROCEDURE — 77063 BREAST TOMOSYNTHESIS BI: CPT | Mod: 26,,, | Performed by: RADIOLOGY

## 2020-07-02 PROCEDURE — 77063 MAMMO DIGITAL SCREENING BILAT WITH TOMOSYNTHESIS_CAD: ICD-10-PCS | Mod: 26,,, | Performed by: RADIOLOGY

## 2020-07-02 PROCEDURE — 77067 SCR MAMMO BI INCL CAD: CPT | Mod: 26,,, | Performed by: RADIOLOGY

## 2020-07-02 PROCEDURE — 77067 MAMMO DIGITAL SCREENING BILAT WITH TOMOSYNTHESIS_CAD: ICD-10-PCS | Mod: 26,,, | Performed by: RADIOLOGY

## 2020-07-02 PROCEDURE — 77067 SCR MAMMO BI INCL CAD: CPT | Mod: TC

## 2020-11-08 ENCOUNTER — HEALTH MAINTENANCE LETTER (OUTPATIENT)
Age: 55
End: 2020-11-08

## 2021-04-13 ENCOUNTER — PATIENT MESSAGE (OUTPATIENT)
Dept: RESEARCH | Facility: HOSPITAL | Age: 56
End: 2021-04-13

## 2021-07-27 ENCOUNTER — PATIENT MESSAGE (OUTPATIENT)
Dept: OBSTETRICS AND GYNECOLOGY | Facility: CLINIC | Age: 56
End: 2021-07-27

## 2021-07-27 DIAGNOSIS — Z12.31 SCREENING MAMMOGRAM FOR HIGH-RISK PATIENT: Primary | ICD-10-CM

## 2021-09-11 ENCOUNTER — HEALTH MAINTENANCE LETTER (OUTPATIENT)
Age: 56
End: 2021-09-11

## 2021-10-01 ENCOUNTER — PATIENT MESSAGE (OUTPATIENT)
Dept: OBSTETRICS AND GYNECOLOGY | Facility: CLINIC | Age: 56
End: 2021-10-01

## 2021-10-01 DIAGNOSIS — N90.4 LICHEN SCLEROSUS ET ATROPHICUS OF THE VULVA: ICD-10-CM

## 2021-10-01 RX ORDER — CLOBETASOL PROPIONATE 0.5 MG/G
CREAM TOPICAL NIGHTLY
Qty: 30 G | Refills: 2 | Status: CANCELLED | OUTPATIENT
Start: 2021-10-01

## 2021-10-04 RX ORDER — CLOBETASOL PROPIONATE 0.5 MG/G
CREAM TOPICAL NIGHTLY
Qty: 30 G | Refills: 2 | Status: SHIPPED | OUTPATIENT
Start: 2021-10-04

## 2021-11-06 ENCOUNTER — HEALTH MAINTENANCE LETTER (OUTPATIENT)
Age: 56
End: 2021-11-06

## 2022-01-01 ENCOUNTER — HEALTH MAINTENANCE LETTER (OUTPATIENT)
Age: 57
End: 2022-01-01

## 2022-10-30 ENCOUNTER — HEALTH MAINTENANCE LETTER (OUTPATIENT)
Age: 57
End: 2022-10-30

## 2023-04-08 ENCOUNTER — HEALTH MAINTENANCE LETTER (OUTPATIENT)
Age: 58
End: 2023-04-08

## 2023-11-12 ENCOUNTER — HEALTH MAINTENANCE LETTER (OUTPATIENT)
Age: 58
End: 2023-11-12